# Patient Record
Sex: FEMALE | Race: WHITE | Employment: PART TIME | URBAN - METROPOLITAN AREA
[De-identification: names, ages, dates, MRNs, and addresses within clinical notes are randomized per-mention and may not be internally consistent; named-entity substitution may affect disease eponyms.]

---

## 2018-05-18 ENCOUNTER — HOSPITAL ENCOUNTER (EMERGENCY)
Facility: CLINIC | Age: 28
Discharge: HOME | End: 2018-05-19
Attending: EMERGENCY MEDICINE | Admitting: EMERGENCY MEDICINE
Payer: COMMERCIAL

## 2018-05-18 VITALS
SYSTOLIC BLOOD PRESSURE: 120 MMHG | RESPIRATION RATE: 16 BRPM | DIASTOLIC BLOOD PRESSURE: 83 MMHG | OXYGEN SATURATION: 98 % | TEMPERATURE: 98 F

## 2018-05-18 DIAGNOSIS — T78.40XA ALLERGIC REACTION, INITIAL ENCOUNTER: ICD-10-CM

## 2018-05-18 LAB — HCG UR QL: NEGATIVE

## 2018-05-18 PROCEDURE — 81025 URINE PREGNANCY TEST: CPT | Performed by: EMERGENCY MEDICINE

## 2018-05-18 PROCEDURE — 99283 EMERGENCY DEPT VISIT LOW MDM: CPT

## 2018-05-18 RX ORDER — DIPHENHYDRAMINE HCL 25 MG
25 CAPSULE ORAL ONCE
Status: COMPLETED | OUTPATIENT
Start: 2018-05-18 | End: 2018-05-19

## 2018-05-18 ASSESSMENT — ENCOUNTER SYMPTOMS
FACIAL SWELLING: 0
VOMITING: 0
NAUSEA: 0

## 2018-05-18 NOTE — ED AVS SNAPSHOT
[facility] Emergency Department    [address] [place] [address]    Phone:  [phone]    Fax:  [phone]                                       Ramya Romero   MRN: [id number]    Department:  [facility] Emergency Department   Date of Visit:  5/18/2018           Patient Information     Date Of Birth          1990        Your diagnoses for this visit were:     Allergic reaction, initial encounter        You were seen by Alex Ayala MD.      Follow-up Information     Follow up with Daniella Urbina NP. Schedule an appointment as soon as possible for a visit in 2 days.    Contact information:    [facility] [place]  [address]   [place] [address]  [phone]          Follow up with [facility] Emergency Department.    Specialty:  EMERGENCY MEDICINE    Why:  If symptoms worsen    Contact information:    [address] [place] [id number] [phone]        Discharge Instructions       Discharge Instructions  Allergic Reaction    An allergic reaction can result in a rash, itching, swelling, watery eyes, or a runny nose. A serious reaction can cause swelling of your mouth or throat, or difficulty breathing (wheezing). The most serious allergy is called anaphylaxis, and can be life-threatening. Many allergies result in hives, also called urticaria.       An allergy happens when the body s natural defense system (immune system) overreacts to something. The thing that triggers your allergic reaction is called an allergen. The first time you are exposed to your allergen, you may not have any reaction, but the body makes a protein called an antibody. The antibody lets the body recognize and remember the allergen.  Every time you are exposed to your allergen you get more antibody and your reaction can be more severe.      Generally, every Emergency Department visit should have a follow-up clinic visit with  either a primary or a specialty clinic/provider. Please follow-up as instructed by your emergency provider today.    Call 911 if you have:    Swelling of the lips, tongue or throat.    Hoarse voice, drooling or trouble breathing.    Chest pain or shortness of breath.    Fainting or unconsciousness.    What can I do to help myself?    If you know what caused your allergy, do not touch it, throw any of it away, and tell others not to have it around you. Wear a medical alert bracelet with a name of your allergen on it.    If you do not know what you are allergic to, keep a journal of everything that you are exposed to (foods, soaps, medicines, etc.). Take this with you when you follow up with your primary provider or specialist (Allergist). This may help determine what is causing the allergic reaction.    Take any medicines that are prescribed.    Antihistamines can decrease rash or itching. You may use Benadryl  (diphenhydramine) for rash or itching according to package directions, or use a prescription antihistamine as recommended by your provider.    For significant allergic reactions, you may have been given a prescription for an epinephrine (adrenaline) auto injector. Carry this with you at all times! Use it if you are having any symptoms of anaphylaxis.  Do not be afraid to use it. Return to the Emergency Department if you use your auto injector, call 911 if it does not resolve the symptoms. It is only meant to buy time until you can get to the Emergency Department!  If you were given a prescription for medicine here today, be sure to read all of the information (including the package insert) that comes with your prescription.  This will include important information about the medicine, its side effects, and any warnings that you need to know about.  The pharmacist who fills the prescription can provide more information and answer questions you may have about the medicine.  If you have questions or concerns that  the pharmacist cannot address, please call or return to the Emergency Department.   Remember that you can always come back to the Emergency Department if you are not able to see your regular provider in the amount of time listed above, if you get any new symptoms, or if there is anything that worries you.      24 Hour Appointment Hotline       To make an appointment at any [facility], call [phone] ([phone]). If you don't have a family doctor or clinic, we will help you find one. [facility] are conveniently located to serve the needs of you and your family.             Review of your medicines      Our records show that you are taking the medicines listed below. If these are incorrect, please call your family doctor or clinic.        Dose / Directions Last dose taken    LEVOTHYROXINE SODIUM PO        Take  by mouth daily.   Refills:  0        VITAMIN D (CHOLECALCIFEROL) PO        Take  by mouth daily.   Refills:  0                Procedures and tests performed during your visit     HCG qualitative urine (UPT)      Orders Needing Specimen Collection     None      Pending Results     No orders found for last 3 day(s).            Pending Culture Results     No orders found for last 3 day(s).            Pending Results Instructions     If you had any lab results that were not finalized at the time of your Discharge, you can call the ED Lab Result RN at [phone]. You will be contacted by this team for any positive Lab results or changes in treatment. The nurses are available 7 days a week from 10A to 6:30P.  You can leave a message 24 hours per day and they will return your call.        Test Results From Your Hospital Stay        5/18/2018 11:59 PM      Component Results     Component Value Ref Range & Units Status    HCG Qual Urine Negative NEG^Negative Final    This test is for screening purposes.  Results should be interpreted along with   the clinical picture.  Confirmation testing is  available if warranted by   ordering EIM965, HCG Quantitative Pregnancy.                  Clinical Quality Measure: Blood Pressure Screening     Your blood pressure was checked while you were in the emergency department today. The last reading we obtained was  BP: 120/83 . Please read the guidelines below about what these numbers mean and what you should do about them.  If your systolic blood pressure (the top number) is less than 120 and your diastolic blood pressure (the bottom number) is less than 80, then your blood pressure is normal. There is nothing more that you need to do about it.  If your systolic blood pressure (the top number) is 120-139 or your diastolic blood pressure (the bottom number) is 80-89, your blood pressure may be higher than it should be. You should have your blood pressure rechecked within a year by a primary care provider.  If your systolic blood pressure (the top number) is 140 or greater or your diastolic blood pressure (the bottom number) is 90 or greater, you may have high blood pressure. High blood pressure is treatable, but if left untreated over time it can put you at risk for heart attack, stroke, or kidney failure. You should have your blood pressure rechecked by a primary care provider within the next 4 weeks.  If your provider in the emergency department today gave you specific instructions to follow-up with your doctor or provider even sooner than that, you should follow that instruction and not wait for up to 4 weeks for your follow-up visit.        Thank you for choosing [facility]       Thank you for choosing [facility] for your care. Our goal is always to provide you with excellent care. Hearing back from our patients is one way we can continue to improve our services. Please take a few minutes to complete the written survey that you may receive in the mail after you visit with us. Thank you!        [employer] Information     [employer] lets you send messages to your doctor, view  "your test results, renew your prescriptions, schedule appointments and more. To sign up, go to [email or web address] . Click on \"Log in\" on the left side of the screen, which will take you to the Welcome page. Then click on \"Sign up Now\" on the right side of the page.     You will be asked to enter the access code listed below, as well as some personal information. Please follow the directions to create your username and password.     Your access code is: [email or web address]  Expires: 2018 12:14 AM     Your access code will  in 90 days. If you need help or a new code, please call your [facility] clinic or [phone].        Care EveryWhere ID     This is your Care EveryWhere ID. This could be used by other organizations to access your [facility] medical records  [id number]        Equal Access to Services     GIOVANI TORRES : Josie Sanders, waashwin monique, debra hernandezaldon zheng, felipe araujo . So [facility] [phone].    ATENCIÓN: Si habla español, tiene a winn disposición servicios gratuitos de asistencia lingüística. Llame al [phone].    We comply with applicable federal civil rights laws and [place] laws. We do not discriminate on the basis of race, color, national origin, age, disability, sex, sexual orientation, or gender identity.            After Visit Summary       This is your record. Keep this with you and show to your community pharmacist(s) and doctor(s) at your next visit.                  "

## 2018-05-18 NOTE — ED AVS SNAPSHOT
[facility] Emergency Department    [address]    [facility] [address]    Phone:  [phone]    Fax:  [phone]                                       Ramya Romero   MRN: [id number]    Department:  [facility] Emergency Department   Date of Visit:  5/18/2018           After Visit Summary Signature Page     I have received my discharge instructions, and my questions have been answered. I have discussed any challenges I see with this plan with the nurse or doctor.    ..........................................................................................................................................  Patient/Patient Representative Signature      ..........................................................................................................................................  Patient Representative Print Name and Relationship to Patient    ..................................................               ................................................  Date                                            Time    ..........................................................................................................................................  Reviewed by Signature/Title    ...................................................              ..............................................  Date                                                            Time

## 2018-05-19 PROCEDURE — 25000132 ZZH RX MED GY IP 250 OP 250 PS 637: Performed by: EMERGENCY MEDICINE

## 2018-05-19 PROCEDURE — 25000131 ZZH RX MED GY IP 250 OP 636 PS 637: Performed by: EMERGENCY MEDICINE

## 2018-05-19 RX ADMIN — DEXAMETHASONE 6 MG: 2 TABLET ORAL at 00:03

## 2018-05-19 RX ADMIN — DIPHENHYDRAMINE HYDROCHLORIDE 25 MG: 25 CAPSULE ORAL at 00:03

## 2018-05-19 NOTE — ED TRIAGE NOTES
Pt has large amount of swelling to left hand after being stung by an unknown bug at 2115.  Parents used topical benadryl at the sting site, the swelling started after this.  No hives, no other swelling.  ABCD intact in triage.

## 2018-05-19 NOTE — ED PROVIDER NOTES
"  History     Chief Complaint:  Insect Bite    The history is provided by the patient.      Ramya Romero is a 27 year old female who presents to the emergency department today for evaluation of an insect bite. At approximately 2115 tonight, the patient had reached her left hand into a pot of soil in her yard when she felt a sharp stinging on her left hand. She did not see what had bitten her. She sprayed a topical Benadryl on the hand, and shortly after that her left hand began to swell. She initially had a lot of pain in the hand, but the pain has resolved and she now only feels \"tightness and numbness.\"  She denies lip swelling, tongue swelling, nausea, vomiting, rashes. She is currently on Amoxicillin for strep throat but is otherwise healthy. She has been stung by a bee in the past, but never experienced swelling like this. She states that she is not pregnant since last week, but is not 100% sure.     Allergies:  Sulfa Drugs    Medications:    Levothyroxine   Cholecalciferol    Past Medical History:    Thyroid disease    Past Surgical History:    Arthroscopy knee  Breath hydrogen test    Family History:    Family history reviewed. No pertinent family history.     Social History:  The patient was accompanied to the ED by .  Smoking Status: Never Smoker  Alcohol Use: Negative   Marital Status:      Review of Systems   HENT: Negative for facial swelling.    Gastrointestinal: Negative for nausea and vomiting.   Musculoskeletal:        Left hand swelling   Skin: Negative for rash.   All other systems reviewed and are negative.    Physical Exam     Patient Vitals for the past 24 hrs:   BP Temp Temp src Heart Rate Resp SpO2   05/18/18 2204 120/83 98  F (36.7  C) Temporal 76 16 98 %      Physical Exam  General:                        Well-nourished                        Speaking in full sentences  Eyes:                        Conjunctiva without injection or scleral icterus  Resp:                        " Lungs CTAB                        No crackles, wheezing or audible rubs                        Good air movement  CV:                                        Normal rate, regular rhythm                        S1 and S2 present                        No murmur, gallop or rub  GI:                        BS present                        Abdomen soft without distention                        Non-tender to light and deep palpation                        No guarding or rebound tenderness  Skin:                        Warm, dry, well perfused                        No rashes or open wounds on exposed skin  MSK:                        Moves all extremities                        L Hand:                        Swelling noted about palm and dorsum of hand as well as all digits                        No significant pain to palpation, nor pain with passive flexion/extension about digits                        Cap refill <2 sec throughout                        2+ radial pulse  Neuro:                        Alert                        Answers questions appropriately                        Moves all extremities equally                        Gait stable  Psych:                        Normal affect, normal mood     Emergency Department Course     Laboratory:  Laboratory findings were communicated with the patient who voiced understanding of the findings.    HCG qualitative urine: Negative     Interventions:  0003 Decadron 6 mg PO   0003 Benadryl 25 mg PO     Emergency Department Course:    Nursing notes and vitals reviewed.    2310 I performed an exam of the patient as documented above.     The patient provided a urine sample here in the emergency department. This was sent for laboratory testing, findings above.      0010 I personally reviewed the lab results with the patient. I discussed the treatment plan with the patient. She expressed understanding of this plan and consented to discharge. She will be discharged home with  instructions for care and follow up. In addition, the patient will return to the emergency department if their symptoms persist, worsen, if new symptoms arise or if there is any concern.  All questions were answered.     Impression & Plan      Medical Decision Making:  Ramya Romero is a 27 year old female who presents to the emergency department today for evaluation of an insect bite.  VS on presentation are within normal limits.  Examination is notable for swelling and erythema localized about the hand and digits.  I do not appreciate any evidence of retained stinger.  There is no open wound nor drainage.  Symptoms are most consistent with acute allergic reaction likely secondary to insect sting.  There are no other signs consistent with systemic reaction including lip swelling, tongue swelling, stridor, wheezing, respiratory distress, nor GI upset.  I do not feel patient requires EpiPen at this time.  Patient reports the swelling is no longer progressing, and pain is improving.  Presentation not consistent with compartment syndrome.  Patient demonstrates intact capillary refill distally, does not exhibit significant pain with passive stretching of her digits.  Clinical impression reviewed with patient.  She was provided 1 dose of oral Decadron as well as Benadryl to assist with swelling and localized reaction.  I feel she is stable for discharge home, with Benadryl as needed.  I have recommended close monitoring of symptoms.  Return to ER with severe pain, significant swelling, or development of any other new or troubling symptoms.  Patient and spouse felt comfortable with this plan of care and all questions were answered prior to discharge.    Diagnosis:    ICD-10-CM    1. Allergic reaction, initial encounter T78.40XA HCG qualitative urine (UPT)     Disposition:   The patient is discharged to home.     Scribe Disclosure:  Sofie ROTHMAN, am serving as a scribe at 10:54 PM on 5/18/2018 to document services  personally performed by Alex Ayala MD, based on my observations and the provider's statements to me.      [facility] EMERGENCY DEPARTMENT       Alex Ayala MD  05/19/18 8430